# Patient Record
Sex: MALE | Race: BLACK OR AFRICAN AMERICAN | Employment: STUDENT | URBAN - METROPOLITAN AREA
[De-identification: names, ages, dates, MRNs, and addresses within clinical notes are randomized per-mention and may not be internally consistent; named-entity substitution may affect disease eponyms.]

---
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
## 2025-07-07 ENCOUNTER — OFFICE VISIT (OUTPATIENT)
Dept: FAMILY MEDICINE CLINIC | Age: 20
End: 2025-07-07

## 2025-07-07 VITALS
OXYGEN SATURATION: 100 % | WEIGHT: 140.2 LBS | SYSTOLIC BLOOD PRESSURE: 100 MMHG | BODY MASS INDEX: 17.07 KG/M2 | RESPIRATION RATE: 18 BRPM | HEART RATE: 68 BPM | DIASTOLIC BLOOD PRESSURE: 60 MMHG | HEIGHT: 76 IN

## 2025-07-07 DIAGNOSIS — Z76.89 ENCOUNTER TO ESTABLISH CARE: Primary | ICD-10-CM

## 2025-07-07 DIAGNOSIS — Z00.00 ANNUAL VISIT FOR GENERAL ADULT MEDICAL EXAMINATION WITHOUT ABNORMAL FINDINGS: ICD-10-CM

## 2025-07-07 SDOH — HEALTH STABILITY: PHYSICAL HEALTH: ON AVERAGE, HOW MANY MINUTES DO YOU ENGAGE IN EXERCISE AT THIS LEVEL?: 150+ MIN

## 2025-07-07 SDOH — ECONOMIC STABILITY: FOOD INSECURITY: WITHIN THE PAST 12 MONTHS, THE FOOD YOU BOUGHT JUST DIDN'T LAST AND YOU DIDN'T HAVE MONEY TO GET MORE.: NEVER TRUE

## 2025-07-07 SDOH — ECONOMIC STABILITY: FOOD INSECURITY: WITHIN THE PAST 12 MONTHS, YOU WORRIED THAT YOUR FOOD WOULD RUN OUT BEFORE YOU GOT MONEY TO BUY MORE.: NEVER TRUE

## 2025-07-07 SDOH — HEALTH STABILITY: PHYSICAL HEALTH: ON AVERAGE, HOW MANY DAYS PER WEEK DO YOU ENGAGE IN MODERATE TO STRENUOUS EXERCISE (LIKE A BRISK WALK)?: 4 DAYS

## 2025-07-07 ASSESSMENT — ENCOUNTER SYMPTOMS
CHEST TIGHTNESS: 0
CONSTIPATION: 0
BACK PAIN: 0
VOMITING: 0
COLOR CHANGE: 0
SINUS PAIN: 0
EYE PAIN: 0
DIARRHEA: 0
NAUSEA: 0
SHORTNESS OF BREATH: 0
ABDOMINAL PAIN: 0
SINUS PRESSURE: 0

## 2025-07-07 ASSESSMENT — PATIENT HEALTH QUESTIONNAIRE - PHQ9
2. FEELING DOWN, DEPRESSED OR HOPELESS: NOT AT ALL
SUM OF ALL RESPONSES TO PHQ QUESTIONS 1-9: 0
1. LITTLE INTEREST OR PLEASURE IN DOING THINGS: NOT AT ALL
SUM OF ALL RESPONSES TO PHQ QUESTIONS 1-9: 0

## 2025-07-07 NOTE — PROGRESS NOTES
[address] [facility] [address]   7/10/2025    Michael Hay is a 19 y.o. male who presents today for his medical conditions and/or complaints as noted below.    Michael Hay is scheduled today for New Patient (Has not had a PCP in 4 years. /) and Health Maintenance (Depression screen completed. /SDOH completed. /)        HPI:     History of Present Illness  The patient is a 19-year-old male who presents to establish care with a new provider.    He has not had a primary care provider since his pediatric years. He is currently not on any daily medications and has no known allergies. He reports no history of childhood asthma. He is up to date with his dental and vision exams. He has previously undergone STI testing and reports no current concerns related to STIs. He is not experiencing any chest pain, shortness of breath, or abdominal pain. His bowel movements are regular, and he reports no dizziness or lightheadedness. He also reports no uncontrolled feelings of depression or anxiety. He is curious about his current height and weight.       SOCIAL HISTORY  He does not smoke, drink alcohol, use drugs including marijuana, or use e-cigarettes or vaping devices. He works part-time at a company called [employer]. He lives with his mother.          Vitals:    07/07/25 1009   BP: 100/60   Pulse: 68   Resp: 18   SpO2: 100%   Weight: 63.6 kg (140 lb 3.2 oz)   Height: 1.93 m (6' 4\")      History reviewed. No pertinent past medical history.   Past Surgical History:   Procedure Laterality Date    KNEE ARTHROSCOPY Left 07/13/2021    LEFT KNEE ARTHROSCOPY WITH PARTIAL LATERAL MENISECTOMY VS REPAIR- ARTHREX performed by Flakito Mac DO at [facility] OR    KNEE SURGERY  1/2021     Family History   Problem Relation Age of Onset    No Known Problems Mother     No Known Problems Father     No Known Problems Half-Brother     No Known Problems Half-Brother     No Known Problems Half-Brother      Social History     Tobacco Use

## (undated) DEVICE — TUBING PMP L16FT MAIN DISP FOR AR-6400 AR-6475

## (undated) DEVICE — SUTURE ETHLN SZ 4-0 L18IN NONABSORBABLE BLK L19MM PS-2 3/8 1667H

## (undated) DEVICE — Z INACTIVE USE 2660664 SOLUTION IRRIG 3000ML 0.9% SOD CHL USP UROMATIC PLAS CONT

## (undated) DEVICE — PAD COOL W10.9XL11.3IN UNIV REG HOSE WRP ON THER CLD

## (undated) DEVICE — TUBING, SUCTION, 1/4" X 12', STRAIGHT: Brand: MEDLINE

## (undated) DEVICE — SUTURE MCRYL SZ 3-0 L27IN ABSRB UD L24MM PS-1 3/8 CIR PRIM Y936H

## (undated) DEVICE — Device

## (undated) DEVICE — APPLICATOR MEDICATED 26 CC SOLUTION HI LT ORNG CHLORAPREP

## (undated) DEVICE — [AGGRESSIVE PLUS CUTTER, ARTHROSCOPIC SHAVER BLADE,  DO NOT RESTERILIZE,  DO NOT USE IF PACKAGE IS DAMAGED,  KEEP DRY,  KEEP AWAY FROM SUNLIGHT]: Brand: FORMULA

## (undated) DEVICE — Z DUP USE 2650846 BRACE KNEE UNIV L46 65CM THGH 76CM LTWT EZ TO USE HNG EZ TO

## (undated) DEVICE — TUBING FLD MGMT Y DBL SPIK DUALWAVE

## (undated) DEVICE — GLOVE SURG SZ 85 L12IN FNGR ORTHO 126MIL CRM LTX FREE

## (undated) DEVICE — [SLOTTED WHISKER CUTTER, ARTHROSCOPIC SHAVER BLADE,  DO NOT RESTERILIZE,  DO NOT USE IF PACKAGE IS DAMAGED,  KEEP DRY,  KEEP AWAY FROM SUNLIGHT]: Brand: FORMULA

## (undated) DEVICE — BLANKET WRM W29.9XL79.1IN UP BODY FORC AIR MISTRAL-AIR

## (undated) DEVICE — DEVICE MENIS CRV IMPL JUGGER STIT DISP

## (undated) DEVICE — COVER,MAYO STAND,XL,STERILE: Brand: MEDLINE

## (undated) DEVICE — SUTURE VCRL SZ 2-0 L27IN ABSRB UD L36MM CP-1 1/2 CIR REV J266H

## (undated) DEVICE — GOWN,AURORA,NON-REINFORCED,2XL: Brand: MEDLINE

## (undated) DEVICE — DILATOR ENDOSCP SKID PORTAL DISPOSABLE

## (undated) DEVICE — DRESSING PETRO W3XL8IN OIL EMUL N ADH GZ KNIT IMPREG CELOS

## (undated) DEVICE — DISC SUCT FLR DLX QUICKSUITE

## (undated) DEVICE — SUTURE VCRL SZ 0 L27IN ABSRB UD L36MM CP-1 1/2 CIR REV CUT J267H